# Patient Record
Sex: FEMALE | Race: BLACK OR AFRICAN AMERICAN | NOT HISPANIC OR LATINO | URBAN - METROPOLITAN AREA
[De-identification: names, ages, dates, MRNs, and addresses within clinical notes are randomized per-mention and may not be internally consistent; named-entity substitution may affect disease eponyms.]

---

## 2017-06-19 ENCOUNTER — OUTPATIENT (OUTPATIENT)
Dept: OUTPATIENT SERVICES | Facility: HOSPITAL | Age: 42
LOS: 1 days | End: 2017-06-19

## 2017-06-19 VITALS
HEIGHT: 63 IN | RESPIRATION RATE: 16 BRPM | DIASTOLIC BLOOD PRESSURE: 70 MMHG | OXYGEN SATURATION: 98 % | TEMPERATURE: 98 F | SYSTOLIC BLOOD PRESSURE: 110 MMHG | WEIGHT: 195.99 LBS | HEART RATE: 85 BPM

## 2017-06-19 DIAGNOSIS — E89.0 POSTPROCEDURAL HYPOTHYROIDISM: Chronic | ICD-10-CM

## 2017-06-19 DIAGNOSIS — Z30.2 ENCOUNTER FOR STERILIZATION: ICD-10-CM

## 2017-06-19 LAB
BLD GP AB SCN SERPL QL: NEGATIVE — SIGNIFICANT CHANGE UP
HCT VFR BLD CALC: 37.1 % — SIGNIFICANT CHANGE UP (ref 34.5–45)
HGB BLD-MCNC: 11.6 G/DL — SIGNIFICANT CHANGE UP (ref 11.5–15.5)
MCHC RBC-ENTMCNC: 27.8 PG — SIGNIFICANT CHANGE UP (ref 27–34)
MCHC RBC-ENTMCNC: 31.3 % — LOW (ref 32–36)
MCV RBC AUTO: 89 FL — SIGNIFICANT CHANGE UP (ref 80–100)
PLATELET # BLD AUTO: 207 K/UL — SIGNIFICANT CHANGE UP (ref 150–400)
PMV BLD: 11.3 FL — SIGNIFICANT CHANGE UP (ref 7–13)
RBC # BLD: 4.17 M/UL — SIGNIFICANT CHANGE UP (ref 3.8–5.2)
RBC # FLD: 13.4 % — SIGNIFICANT CHANGE UP (ref 10.3–14.5)
RH IG SCN BLD-IMP: POSITIVE — SIGNIFICANT CHANGE UP
WBC # BLD: 6.94 K/UL — SIGNIFICANT CHANGE UP (ref 3.8–10.5)
WBC # FLD AUTO: 6.94 K/UL — SIGNIFICANT CHANGE UP (ref 3.8–10.5)

## 2017-06-19 RX ORDER — SODIUM CHLORIDE 9 MG/ML
3 INJECTION INTRAMUSCULAR; INTRAVENOUS; SUBCUTANEOUS ONCE
Qty: 0 | Refills: 0 | Status: DISCONTINUED | OUTPATIENT
Start: 2017-07-03 | End: 2017-07-04

## 2017-06-19 RX ORDER — SODIUM CHLORIDE 9 MG/ML
1000 INJECTION, SOLUTION INTRAVENOUS
Qty: 0 | Refills: 0 | Status: DISCONTINUED | OUTPATIENT
Start: 2017-07-03 | End: 2017-07-04

## 2017-06-19 NOTE — H&P PST ADULT - NEGATIVE ENMT SYMPTOMS
no ear pain/no nasal obstruction/no hearing difficulty/no sinus symptoms/no nose bleeds/no throat pain/no vertigo/no tinnitus/no post-nasal discharge/no nasal congestion/no dysphagia

## 2017-06-19 NOTE — H&P PST ADULT - NEGATIVE SKIN SYMPTOMS
no brittle nails/no dryness/no itching/no rash/no change in size/color of mole/no tumor/no hair loss/no pitted nails

## 2017-06-19 NOTE — H&P PST ADULT - NSANTHOSAYNRD_GEN_A_CORE
No. MIRIAM screening performed.  STOP BANG Legend: 0-2 = LOW Risk; 3-4 = INTERMEDIATE Risk; 5-8 = HIGH Risk

## 2017-06-19 NOTE — H&P PST ADULT - RS GEN PE MLT RESP DETAILS PC
no subcutaneous emphysema/no rales/breath sounds equal/clear to auscultation bilaterally/airway patent/no chest wall tenderness/good air movement/respirations non-labored/no intercostal retractions/no rhonchi/no wheezes

## 2017-06-19 NOTE — H&P PST ADULT - NEGATIVE NEUROLOGICAL SYMPTOMS
no facial palsy/no paresthesias/no vertigo/no difficulty walking/no loss of sensation/no syncope/no hemiparesis/no tremors/no generalized seizures/no focal seizures/no weakness/no headache

## 2017-06-19 NOTE — H&P PST ADULT - NEGATIVE GENERAL GENITOURINARY SYMPTOMS
normal urinary frequency/no gas in urine/no incontinence/no nocturia/no flank pain R/no dysuria/no bladder infections/no flank pain L/no urine discoloration/no renal colic/no hematuria/no urinary hesitancy

## 2017-06-19 NOTE — H&P PST ADULT - NEGATIVE MUSCULOSKELETAL SYMPTOMS
no joint swelling/no arthritis/no myalgia/no muscle weakness/no neck pain/no back pain/no muscle cramps

## 2017-06-19 NOTE — H&P PST ADULT - PROBLEM SELECTOR PLAN 1
Scheduled for laparoscopic bilateral tubal ligation on 07/03/17. Pre op instructions, famotidine given and explained. Pt verbalized understanding. Scheduled for laparoscopic bilateral tubal ligation on 07/03/17. Pre op instructions, famotidine given and explained. Pt verbalized understanding.  Pt instructed to bring urine specimen on day of surgery, container given to pt who verbalized understanding.

## 2017-06-19 NOTE — H&P PST ADULT - NEGATIVE GASTROINTESTINAL SYMPTOMS
no melena/no abdominal pain/no diarrhea/no change in bowel habits/no vomiting/no hematochezia/no hiccoughs/no constipation/no nausea

## 2017-06-19 NOTE — H&P PST ADULT - NEGATIVE OPHTHALMOLOGIC SYMPTOMS
no blurred vision R/no lacrimation R/no pain L/no irritation L/no irritation R/no pain R/no diplopia/no loss of vision R/no lacrimation L/no photophobia/no discharge R/no discharge L/no blurred vision L/no loss of vision L

## 2017-06-19 NOTE — H&P PST ADULT - HISTORY OF PRESENT ILLNESS
40 y/o Black female with no significant PMH presents to PST for pre op evaluation in preparation for laparoscopic bilateral tubal ligation on 07/03/17

## 2017-06-19 NOTE — H&P PST ADULT - NEGATIVE GENERAL SYMPTOMS
no fatigue/no sweating/no weight loss/no polyuria/no polydipsia/no weight gain/no chills/no fever/no malaise

## 2017-07-03 ENCOUNTER — OUTPATIENT (OUTPATIENT)
Dept: OUTPATIENT SERVICES | Facility: HOSPITAL | Age: 42
LOS: 1 days | Discharge: HOME | End: 2017-07-03

## 2017-07-03 VITALS
HEART RATE: 84 BPM | HEIGHT: 63 IN | RESPIRATION RATE: 14 BRPM | TEMPERATURE: 99 F | WEIGHT: 195.11 LBS | SYSTOLIC BLOOD PRESSURE: 130 MMHG | OXYGEN SATURATION: 98 % | DIASTOLIC BLOOD PRESSURE: 77 MMHG

## 2017-07-03 VITALS
DIASTOLIC BLOOD PRESSURE: 71 MMHG | OXYGEN SATURATION: 100 % | SYSTOLIC BLOOD PRESSURE: 106 MMHG | HEART RATE: 69 BPM | RESPIRATION RATE: 17 BRPM | TEMPERATURE: 98 F

## 2017-07-03 DIAGNOSIS — E89.0 POSTPROCEDURAL HYPOTHYROIDISM: Chronic | ICD-10-CM

## 2017-07-03 DIAGNOSIS — Z30.2 ENCOUNTER FOR STERILIZATION: ICD-10-CM

## 2017-07-03 RX ORDER — FENTANYL CITRATE 50 UG/ML
50 INJECTION INTRAVENOUS
Qty: 0 | Refills: 0 | Status: DISCONTINUED | OUTPATIENT
Start: 2017-07-03 | End: 2017-07-04

## 2017-07-03 RX ORDER — FENTANYL CITRATE 50 UG/ML
25 INJECTION INTRAVENOUS
Qty: 0 | Refills: 0 | Status: DISCONTINUED | OUTPATIENT
Start: 2017-07-03 | End: 2017-07-04

## 2017-07-03 RX ORDER — ACETAMINOPHEN 500 MG
2 TABLET ORAL
Qty: 0 | Refills: 0 | COMMUNITY

## 2017-07-03 RX ORDER — IBUPROFEN 200 MG
1 TABLET ORAL
Qty: 0 | Refills: 0 | COMMUNITY

## 2017-07-03 RX ORDER — SODIUM CHLORIDE 9 MG/ML
1000 INJECTION, SOLUTION INTRAVENOUS
Qty: 0 | Refills: 0 | Status: DISCONTINUED | OUTPATIENT
Start: 2017-07-03 | End: 2017-07-04

## 2017-07-03 RX ORDER — LEVOTHYROXINE SODIUM 125 MCG
1 TABLET ORAL
Qty: 0 | Refills: 0 | COMMUNITY

## 2017-07-03 RX ORDER — ONDANSETRON 8 MG/1
4 TABLET, FILM COATED ORAL ONCE
Qty: 0 | Refills: 0 | Status: COMPLETED | OUTPATIENT
Start: 2017-07-03 | End: 2017-07-03

## 2017-07-03 RX ADMIN — ONDANSETRON 4 MILLIGRAM(S): 8 TABLET, FILM COATED ORAL at 09:40

## 2017-07-03 RX ADMIN — SODIUM CHLORIDE 125 MILLILITER(S): 9 INJECTION, SOLUTION INTRAVENOUS at 09:44

## 2017-07-03 NOTE — ASU DISCHARGE PLAN (ADULT/PEDIATRIC). - NURSING INSTRUCTIONS
You were given IV Tylenol for pain management.  Please DO NOT take tylenol for the next 6-8 hours (until _______ ). Please do not exceed 3000mg in 24hours.   You were given Toradol for pain management. Please DO Not take Motrin/Ibuprofen (NSAIDS) for the next 6 hours (Until _______). You were given IV Tylenol for pain management.  Please DO NOT take tylenol for the next 6-8 hours (until 2:00PM ). Please do not exceed 3000mg in 24hours.   You were given Toradol for pain management. Please DO Not take Motrin/Ibuprofen (NSAIDS) for the next 6 hours (Until 2:30PM).

## 2017-07-03 NOTE — ASU DISCHARGE PLAN (ADULT/PEDIATRIC). - ASU FOLLOWUP
[facility] ([facility]): [facility]:

## 2017-07-03 NOTE — ASU DISCHARGE PLAN (ADULT/PEDIATRIC). - MEDICATION SUMMARY - MEDICATIONS TO TAKE
I will START or STAY ON the medications listed below when I get home from the hospital:    Tylenol 325 mg oral tablet  -- 2 tab(s) by mouth every 4 hours  -- Indication: For pain    Motrin 600 mg oral tablet  -- 1 tab(s) by mouth every 6 hours  -- Indication: For pain    levothyroxine 75 mcg (0.075 mg) oral capsule  -- 1 cap(s) by mouth once a day  -- Indication: For  home med

## 2017-07-03 NOTE — ASU DISCHARGE PLAN (ADULT/PEDIATRIC). - NOTIFY
Inability to Tolerate Liquids or Foods/Unable to Urinate/Pain not relieved by Medications/GYN Fever>100.4/Swelling that continues/Numbness, color, or temperature change to extremity/Bleeding that does not stop/Persistent Nausea and Vomiting

## 2017-07-04 ENCOUNTER — TRANSCRIPTION ENCOUNTER (OUTPATIENT)
Age: 42
End: 2017-07-04

## 2018-08-24 NOTE — ASU PREOP CHECKLIST - HEIGHT IN INCHES
BILATERAL DIGITAL SCREENING MAMMOGRAM TOMOSYNTHESIS WITH CAD: 8/24/2018

CLINICAL HISTORY: Routine screening. Patient has no complaints.  





TECHNIQUE: Breast tomosynthesis in addition to standard 2D mammography was performed. Current study w
as also evaluated with a Computer Aided Detection (CAD) system.  



COMPARISON: Comparison is made to exams dated:  8/23/2017 mammogram, 8/22[facility]2016 mammogram[facility], 2/25/2013 mammogram, and 10/24/2011 [facility]
mogram.   

BREAST COMPOSITION: The tissue of both breasts is heterogeneously dense, which may obscure small mass
es.  



FINDINGS: 

No suspicious masses, calcifications, or areas of architectural distortion are noted in either breast
. There has been no significant interval change compared to prior exams. Scattered bilateral benign-a
ppearing calcifications are not significantly changed.  





IMPRESSION: ACR BI-RADS CATEGORY 2: BENIGN

There is no mammographic evidence of malignancy. A 1 year screening mammogram is recommended.(08/25/2
019)  The patient will receive written notification of the results.  





Some breast cancers are not detected with mammography. A negative mammographic report should not july
y biopsy if a clinically suggestive mass is present.



Radha Johnson M.D.          

ah/:8/24/2018 13:44:39  



Imaging Technologist: RT Marilynn(DEVIN)(M), [facility]

letter sent: Normal 1/2  

BI-RADS Code: ACR BI-RADS Category 2: Benign 3

## 2019-02-27 NOTE — ASU DISCHARGE PLAN (ADULT/PEDIATRIC). - PATIENT/GUARDIAN NAME (SIGNATURE): ____________________________________
[employer] Return to Work form was received via fax and put in the forms bin at [place] [id number]. Statement Selected

## 2019-12-09 NOTE — ASU DISCHARGE PLAN (ADULT/PEDIATRIC). - DRIVING
Price (Use Numbers Only, No Special Characters Or $): 0 Consent: The patient's consent was obtained including but not limited to risks of crusting, scabbing, blistering, scarring, darker or lighter pigmentary change, recurrence, incomplete removal and infection. Detail Level: Detailed Post-Care Instructions: I reviewed with the patient in detail post-care instructions. Patient is to wear sunprotection, and avoid picking at any of the treated lesions. Pt may apply Vaseline to crusted or scabbing areas. x 24 hours/No

## 2021-02-13 NOTE — BRIEF OPERATIVE NOTE - POST-OP DX
Encounter for female sterilization procedure  07/03/2017    Active  Griselda Sheikh Additional Safety/Bands:

## 2022-10-21 NOTE — ASU PATIENT PROFILE, ADULT - AS [facility]
Patient Education   Personalized Prevention Plan  You are due for the preventive services outlined below.  Your care team is available to assist you in scheduling these services.  If you have already completed any of these items, please share that information with your care team to update in your medical record.  Health Maintenance Due   Topic Date Due     URINE DRUG SCREEN  Never done     Hepatitis B Vaccine (1 of 3 - 3-dose series) Never done     COVID-19 Vaccine (5 - Booster for Pfizer series) 07/05/2022     Flu Vaccine (1) 09/01/2022     FALL RISK ASSESSMENT  10/18/2022        
(4) no limitation

## 2023-12-17 NOTE — ASU DISCHARGE PLAN (ADULT/PEDIATRIC). - ACTIVITY LEVEL
no tampons/no tub baths/no douching/nothing per vagina/no intercourse/1 wk/nothing per rectum no heavy lifting/no sports/gym/no tub baths/1 wk/nothing per vagina/no douching/nothing per rectum/no intercourse/no tampons MED